# Patient Record
Sex: FEMALE | Race: WHITE | URBAN - NONMETROPOLITAN AREA
[De-identification: names, ages, dates, MRNs, and addresses within clinical notes are randomized per-mention and may not be internally consistent; named-entity substitution may affect disease eponyms.]

---

## 2022-07-18 ENCOUNTER — OFFICE VISIT (OUTPATIENT)
Dept: ENT CLINIC | Age: 22
End: 2022-07-18
Payer: MEDICAID

## 2022-07-18 ENCOUNTER — PROCEDURE VISIT (OUTPATIENT)
Dept: ENT CLINIC | Age: 22
End: 2022-07-18
Payer: MEDICAID

## 2022-07-18 VITALS
TEMPERATURE: 96.6 F | OXYGEN SATURATION: 99 % | BODY MASS INDEX: 23.74 KG/M2 | DIASTOLIC BLOOD PRESSURE: 70 MMHG | WEIGHT: 134 LBS | HEIGHT: 63 IN | HEART RATE: 84 BPM | SYSTOLIC BLOOD PRESSURE: 102 MMHG

## 2022-07-18 DIAGNOSIS — R09.89 GLOBUS SENSATION: Primary | ICD-10-CM

## 2022-07-18 DIAGNOSIS — H93.293 ABNORMAL AUDITORY PERCEPTION OF BOTH EARS: Primary | ICD-10-CM

## 2022-07-18 DIAGNOSIS — R13.14 PHARYNGOESOPHAGEAL DYSPHAGIA: ICD-10-CM

## 2022-07-18 PROBLEM — R09.A2 GLOBUS SENSATION: Status: ACTIVE | Noted: 2022-07-18

## 2022-07-18 PROCEDURE — 92552 PURE TONE AUDIOMETRY AIR: CPT | Performed by: AUDIOLOGIST

## 2022-07-18 PROCEDURE — 31575 DIAGNOSTIC LARYNGOSCOPY: CPT | Performed by: PHYSICIAN ASSISTANT

## 2022-07-18 PROCEDURE — 92567 TYMPANOMETRY: CPT | Performed by: AUDIOLOGIST

## 2022-07-18 PROCEDURE — 99203 OFFICE O/P NEW LOW 30 MIN: CPT | Performed by: PHYSICIAN ASSISTANT

## 2022-07-18 RX ORDER — OMEPRAZOLE 20 MG/1
20 CAPSULE, DELAYED RELEASE ORAL
Qty: 60 CAPSULE | Refills: 2 | Status: SHIPPED | OUTPATIENT
Start: 2022-07-18

## 2022-07-18 ASSESSMENT — ENCOUNTER SYMPTOMS
TROUBLE SWALLOWING: 0
SINUS PAIN: 0
SINUS PRESSURE: 0
EYE PAIN: 0
SORE THROAT: 0
FACIAL SWELLING: 0
EYE DISCHARGE: 0
RHINORRHEA: 0
VOICE CHANGE: 0

## 2022-07-18 NOTE — ASSESSMENT & PLAN NOTE
Dysphagia suspicious for possible esophageal stricture  Plan: Due to the laryngoscopy demonstrating no masses or strictures, I have recommended upper GI endoscopy with possible dilatation. Patient is agreeable and wishes to proceed.

## 2022-07-18 NOTE — ASSESSMENT & PLAN NOTE
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 Patient is follow-up in 6 weeks for reevaluation.

## 2022-07-18 NOTE — PROGRESS NOTES
History   Ren Alaniz is a 22 y.o. female who presented to the clinic this date with complaints of decreased hearing bilaterally. She reported a gradual decline over the last year. She reported occasional fullness in both ears when allergies are bad. She denied tinnitus. She reported history of noise exposure. Summary   Results obtained today are consistent with normal TM mobility and normal hearing bilaterally. Results   Otoscopy:   Right: Clear EAC/Normal TM  Left: Clear EAC/Normal TM    Audiometry:   Right: Hearing WNL    Left: Hearing WNL           Tympanometry:    Right: Type A  Left: Type A         Plan   Results of today's testing were discussed with Ms. Palomo and the following recommendations were made: Follow up with ENT as scheduled.         Audiogram and Acoustic Immittance

## 2022-07-18 NOTE — PROGRESS NOTES
Romayne Pinch is a pleasant 62-year-old  female that was referred by Dr. Kelsy Irby due to problems with a globus sensation. Patient reports that she has been getting choked on solid foods about every other day. She reports that she feels like she has something stuck in her throat and has a constant sore throat. She reports the sore throat is worsened at night. She was treated with allergy medications which has not helped her symptoms. Allergies: Patient has no allergy information on record. Current Outpatient Medications   Medication Sig Dispense Refill    omeprazole (PRILOSEC) 20 MG delayed release capsule Take 1 capsule by mouth in the morning and 1 capsule in the evening. Take before meals. 60 capsule 2     No current facility-administered medications for this visit. History reviewed. No pertinent surgical history. Past Medical History:   Diagnosis Date    Allergic        Family History   Problem Relation Age of Onset    High Blood Pressure Maternal Grandmother        Social History     Tobacco Use    Smoking status: Never    Smokeless tobacco: Never   Substance Use Topics    Alcohol use: No     Alcohol/week: 0.0 standard drinks           REVIEW OF SYSTEMS:  all other systems reviewed and are negative  Review of Systems   Constitutional:  Negative for chills and fever. HENT:  Negative for congestion, dental problem, ear discharge, ear pain, facial swelling, hearing loss, nosebleeds, postnasal drip, rhinorrhea, sinus pressure, sinus pain, sneezing, sore throat, tinnitus, trouble swallowing and voice change. Eyes:  Negative for pain and discharge. Neurological:  Negative for dizziness and headaches. Comments:     PHYSICAL EXAM:    /70   Pulse 84   Temp (!) 96.6 °F (35.9 °C) (Temporal)   Ht 5' 3\" (1.6 m)   Wt 134 lb (60.8 kg)   SpO2 99%   BMI 23.74 kg/m²   Body mass index is 23.74 kg/m².     General Appearance: well developed and well nourished  Head/ Face: normocephalic and atraumatic  Vocal Quality: good/ normal  Ears: Right Ear: External: external ears normal Otoscopy Ear Canal: canal clear Otoscopy TM: TM's normal and TM's mobile Left Ear: External: external ears normal Otoscopy Ear Canal: canal clear Otoscopy TM: TM's normal and TM's mobile  Hearing: grossly intact  Nose: nares normal and septum midline  Neck: supple and adenopathy none palpable  Thyroid: normal and nodules No    Assessment & Plan:    Problem List Items Addressed This Visit       Pharyngoesophageal dysphagia     Dysphagia suspicious for possible esophageal stricture  Plan: Due to the laryngoscopy demonstrating no masses or strictures, I have recommended upper GI endoscopy with possible dilatation. Patient is agreeable and wishes to proceed. 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 Patient is follow-up in 6 weeks for reevaluation. Relevant Orders    SC LARYNGOSCOPY FLEXIBLE DIAGNOSTIC (Completed)       Orders Placed This Encounter   Procedures    GAB Agrawal, GastroenterologyJaylen     Referral Priority:   Routine     Referral Type:   Eval and Treat     Referral Reason:   Specialty Services Required     Referred to Provider:   GAB Downey     Requested Specialty:   Family Nurse Practitioner     Number of Visits Requested:   1    SC LARYNGOSCOPY FLEXIBLE DIAGNOSTIC     The nares were anesthetized with 4% lidocaine aerosol bilaterally. Each nare was individually evaluated where the patient was found to have no evidence of nasal polyps or any abnormalities. The right side was utilized for the full procedure.   The posterior wall of the nasopharyngeal region was unremarkable to exam.  The scope was advanced to the oropharyngeal region where the patient was found to have normal swallowing with erythematous changes to the posterior pharynx with no ulcerations. No masses were noted at this level. The scope was advanced to the epiglottis where the vocal cords were well visualized. The vocal cords were symmetrical and fully functional.  The vocal cords were edematous and erythematous consistent with reflux disease. No masses or any gross abnormalities was appreciated. The tongue was extended and demonstrated no abnormalities to the surface. Orders Placed This Encounter   Medications    omeprazole (PRILOSEC) 20 MG delayed release capsule     Sig: Take 1 capsule by mouth in the morning and 1 capsule in the evening. Take before meals. Dispense:  60 capsule     Refill:  2       Electronically signed by Leonela Nunn PA-C on 7/18/22 at 6:18 PM CDT        Please note that this chart was generated using dragon dictation software. Although every effort was made to ensure the accuracy of this automated transcription, some errors in transcription may have occurred.

## 2025-04-11 ENCOUNTER — TELEPHONE (OUTPATIENT)
Dept: OBGYN CLINIC | Age: 25
End: 2025-04-11

## 2025-04-11 NOTE — TELEPHONE ENCOUNTER
(Parent called to cancel, advised that she could not get through to the office earlier, pt will call another time to r/s